# Patient Record
Sex: FEMALE | Race: BLACK OR AFRICAN AMERICAN
[De-identification: names, ages, dates, MRNs, and addresses within clinical notes are randomized per-mention and may not be internally consistent; named-entity substitution may affect disease eponyms.]

---

## 2020-07-12 ENCOUNTER — HOSPITAL ENCOUNTER (OUTPATIENT)
Dept: HOSPITAL 76 - DI | Age: 30
Discharge: HOME | End: 2020-07-12
Attending: INTERNAL MEDICINE
Payer: COMMERCIAL

## 2020-07-12 DIAGNOSIS — K58.8: Primary | ICD-10-CM

## 2020-07-12 DIAGNOSIS — N20.0: ICD-10-CM

## 2020-07-12 PROCEDURE — 76700 US EXAM ABDOM COMPLETE: CPT

## 2020-07-12 NOTE — ULTRASOUND REPORT
PROCEDURE:  Abdomen Complete

 

INDICATIONS:  IRRITABLE BOWEL SYNDROME

 

TECHNIQUE:  

Real-time scanning was performed of the abdominal and retroperitoneal organs, with image documentatio
n.  

 

COMPARISON:  None.

 

FINDINGS:  

 

Liver:  Liver is normal in size and homogeneous in echotexture.  

 

Gallbladder: Gallbladder wall thickness is within normal limits measuring 2 mm. No stones are identif
ied.

 

Biliary ducts:  Intrahepatic bile ducts are non-dilated.  Extrahepatic bile duct caliber measures 3 m
m.  Normal is 6-7 mm or less in diameter, or 10 mm or less post-cholecystectomy.  

 

Pancreas:  Visualized portions of the pancreas are sonographically normal.  

 

Spleen:  Spleen is normal in size and homogeneous in echotexture.  

 

Kidneys:  Kidneys are normal in size and echotexture.  Right kidney measures 10.5 cm long; left kidne
y measures 10.2 cm long.  No hydronephrosis. Bilateral nonobstructing calcifications are present with
 the largest on the left measuring 7 x 5 x 7 mm.

 

Aorta:  Visualized aorta is normal in caliber at less than 3 cm.  

 

Iliacs:  Proximal common iliac arteries are normal in caliber at less than 2.5 cm.  

 

IVC:  Intrahepatic inferior vena cava is patent.  

 

Miscellaneous:  No free abdominal fluid.  

 

IMPRESSION:  

 

1. Subcentimeter bilateral nonobstructing renal calculi. Otherwise, unremarkable exam.

 

Reviewed by: Jazmyn Luna MD on 7/12/2020 9:41 AM PDT

Approved by: Jazmyn Luna MD on 7/12/2020 9:41 AM PDT

 

 

Station ID:  [id number]

## 2020-07-20 ENCOUNTER — HOSPITAL ENCOUNTER (OUTPATIENT)
Dept: HOSPITAL 76 - DI | Age: 30
Discharge: HOME | End: 2020-07-20
Attending: ADVANCED PRACTICE MIDWIFE
Payer: COMMERCIAL

## 2020-07-20 DIAGNOSIS — R21: ICD-10-CM

## 2020-07-20 DIAGNOSIS — Z00.00: Primary | ICD-10-CM

## 2020-07-20 DIAGNOSIS — N63.10: ICD-10-CM

## 2020-07-20 PROCEDURE — 76642 ULTRASOUND BREAST LIMITED: CPT

## 2020-07-21 NOTE — ULTRASOUND REPORT
LIMITED ULTRASOUND OF RIGHT BREAST: 7/20/2020

CLINICAL: Palpable right breast lump.  

 

No prior exams were available for comparison.  

Real-time ultrasound of the right breast 10 o'clock region was performed.  Gray scale images of the r
eal-time examination were reviewed.  

 

No significant abnormalities were seen sonographically in the right breast.  

 

IMPRESSION: BENIGN 

There is no sonographic evidence of malignancy.  

There is no abnormality seen in the right breast to correspond with the area of clinical concern and 
palpable abnormality in the upper outer quadrant which likely represent normal fibroglandular tissue,
 however, recommend clinical followup for persistent or worsening symptoms and/or development of any 
clinically suspicious findings.   

 

Findings and recommendations were conveyed to the patient during today's visit.

 

This exam was interpreted at Station ID: [id number].  

Electronically Signed By: Andrew Bergman M.D.  

aty/:7/20/2020 13:37:23  

 

 

 

Ultrasound BI-RADS: 2 Benign

BI-RADS CATEGORY: (2) - 2

Unspecified - other

 

recall n/a

LATERALITY: (B)

## 2021-04-01 ENCOUNTER — HOSPITAL ENCOUNTER (OUTPATIENT)
Age: 31
End: 2021-04-01
Payer: COMMERCIAL

## 2021-04-01 DIAGNOSIS — Z34.01: Primary | ICD-10-CM

## 2021-04-01 LAB
ADD MANUAL DIFF / SLIDE REVIEW: NO
APPEARANCE UR: CLEAR
BILIRUBIN URINE UA: NEGATIVE
COLOR UR: YELLOW
GLUCOSE URINE UA: NEGATIVE G/DL
HBV SURFACE AG SERPL QL IA: NEGATIVE S/C
HCV AB SERPL QL IA: NEGATIVE S/C
HEMATOCRIT: 40.6 % (ref 36–46)
HEMOGLOBIN: 13.7 G/DL (ref 12–16)
HGB UR QL: (no result)
HIV 1+2 AB+HIV1 P24 AG SERPL QL IA: NEGATIVE
KETONES URINE UA: NEGATIVE
LEUKOCYTE ESTERASE URINE UA: NEGATIVE
LYMPHOCYTES # SPEC AUTO: 2000 /UL (ref 1100–4500)
MCV RBC: 84.4 FL (ref 80–100)
MEAN CORPUSCULAR HEMOGLOBIN: 28.4 PG (ref 26–34)
MEAN CORPUSCULAR HGB CONC: 33.7 % (ref 30–36)
NITRITE URINE UA: NEGATIVE
PH UR: 6.5 [PH] (ref 4.5–8)
PLATELET COUNT: 330 X10^3/UL (ref 150–400)
PROTEIN URINE UA: NEGATIVE
SP GR UR: 1.02 (ref 1–1.03)
UROBILINOGEN UR QL: 0.2 E.U./DL

## 2021-04-01 PROCEDURE — 86850 RBC ANTIBODY SCREEN: CPT

## 2021-04-01 PROCEDURE — 87389 HIV-1 AG W/HIV-1&-2 AB AG IA: CPT

## 2021-04-01 PROCEDURE — 86901 BLOOD TYPING SEROLOGIC RH(D): CPT

## 2021-04-01 PROCEDURE — 86900 BLOOD TYPING SEROLOGIC ABO: CPT

## 2021-04-01 PROCEDURE — 87086 URINE CULTURE/COLONY COUNT: CPT

## 2021-04-01 PROCEDURE — 87077 CULTURE AEROBIC IDENTIFY: CPT

## 2021-04-01 PROCEDURE — 86787 VARICELLA-ZOSTER ANTIBODY: CPT

## 2021-04-01 PROCEDURE — 87186 SC STD MICRODIL/AGAR DIL: CPT

## 2021-04-01 PROCEDURE — 87147 CULTURE TYPE IMMUNOLOGIC: CPT

## 2021-04-01 PROCEDURE — 36415 COLL VENOUS BLD VENIPUNCTURE: CPT

## 2021-04-01 PROCEDURE — 81003 URINALYSIS AUTO W/O SCOPE: CPT

## 2021-04-01 PROCEDURE — 86803 HEPATITIS C AB TEST: CPT

## 2021-04-29 ENCOUNTER — HOSPITAL ENCOUNTER (OUTPATIENT)
Age: 31
End: 2021-04-29
Payer: COMMERCIAL

## 2021-04-29 DIAGNOSIS — Z34.01: Primary | ICD-10-CM

## 2021-04-29 PROCEDURE — 87077 CULTURE AEROBIC IDENTIFY: CPT

## 2021-04-29 PROCEDURE — 87086 URINE CULTURE/COLONY COUNT: CPT

## 2021-06-24 ENCOUNTER — HOSPITAL ENCOUNTER
Age: 31
End: 2021-06-24
Payer: COMMERCIAL

## 2021-06-24 ENCOUNTER — HOSPITAL ENCOUNTER (OUTPATIENT)
Age: 31
End: 2021-06-24
Payer: COMMERCIAL

## 2021-06-24 DIAGNOSIS — Z34.02: Primary | ICD-10-CM

## 2021-06-24 DIAGNOSIS — Z3A.18: ICD-10-CM

## 2021-06-24 LAB
C TRACH DNA UR QL NAA+PROBE: NOT DETECTED
N GONORRHOEA RRNA UR QL NAA+PROBE: NOT DETECTED

## 2021-06-24 PROCEDURE — 87491 CHLMYD TRACH DNA AMP PROBE: CPT

## 2021-06-24 PROCEDURE — 36415 COLL VENOUS BLD VENIPUNCTURE: CPT

## 2021-06-24 PROCEDURE — 82105 ALPHA-FETOPROTEIN SERUM: CPT

## 2021-06-24 PROCEDURE — 86336 INHIBIN A: CPT

## 2021-06-24 PROCEDURE — 84702 CHORIONIC GONADOTROPIN TEST: CPT

## 2021-06-24 PROCEDURE — 82677 ASSAY OF ESTRIOL: CPT

## 2021-06-24 PROCEDURE — 87591 N.GONORRHOEAE DNA AMP PROB: CPT

## 2021-06-26 LAB
AFP SERPL-MCNC: 57.2 NG/ML
HCG ADJ MOM SERPL: 1.67
HCG SERPL-ACNC: (no result) MIU/ML
INHIBIN A ADJ MOM SERPL: 1.17
INHIBIN A SERPL-MCNC: 190.95 PG/ML
U ESTRIOL SERPL-MCNC: 1.48 NG/ML

## 2021-07-08 ENCOUNTER — HOSPITAL ENCOUNTER (OUTPATIENT)
Age: 31
End: 2021-07-08
Payer: COMMERCIAL

## 2021-07-08 DIAGNOSIS — Z3A.20: ICD-10-CM

## 2021-07-08 DIAGNOSIS — Z34.02: Primary | ICD-10-CM

## 2021-07-08 PROCEDURE — 76811 OB US DETAILED SNGL FETUS: CPT

## 2021-08-10 ENCOUNTER — HOSPITAL ENCOUNTER (OUTPATIENT)
Age: 31
End: 2021-08-10
Payer: COMMERCIAL

## 2021-08-10 DIAGNOSIS — Z3A.25: ICD-10-CM

## 2021-08-10 DIAGNOSIS — Z34.82: Primary | ICD-10-CM

## 2021-08-10 LAB
GTT (PREG) 1 HOUR PP 50GM DOSE: 165 MG/DL (ref 76–139)
HEMATOCRIT: 34.5 % (ref 36–46)
HEMOGLOBIN: 11.6 G/DL (ref 12–16)

## 2021-08-10 PROCEDURE — 82950 GLUCOSE TEST: CPT

## 2021-08-10 PROCEDURE — 36415 COLL VENOUS BLD VENIPUNCTURE: CPT

## 2021-08-10 PROCEDURE — 85014 HEMATOCRIT: CPT

## 2021-08-10 PROCEDURE — 85018 HEMOGLOBIN: CPT

## 2021-11-03 ENCOUNTER — HOSPITAL ENCOUNTER (OUTPATIENT)
Age: 31
End: 2021-11-03
Payer: COMMERCIAL

## 2021-11-03 DIAGNOSIS — Z34.03: Primary | ICD-10-CM

## 2021-11-03 DIAGNOSIS — Z3A.36: ICD-10-CM

## 2021-11-03 PROCEDURE — 87653 STREP B DNA AMP PROBE: CPT

## 2021-11-04 LAB — GP B STREP DNA SPEC QL NAA+PROBE: (no result)

## 2021-11-15 ENCOUNTER — HOSPITAL ENCOUNTER (INPATIENT)
Dept: HOSPITAL 76 - FBP | Age: 31
LOS: 3 days | Discharge: HOME | End: 2021-11-18
Attending: OBSTETRICS & GYNECOLOGY | Admitting: OBSTETRICS & GYNECOLOGY
Payer: COMMERCIAL

## 2021-11-15 DIAGNOSIS — Z3A.39: ICD-10-CM

## 2021-11-15 DIAGNOSIS — O24.419: ICD-10-CM

## 2021-11-15 LAB
AMPHET UR QL SCN: NEGATIVE
BARBITURATES UR QL SCN>300 NG/ML: NEGATIVE
BASOPHILS NFR BLD AUTO: 0 10^3/UL (ref 0–0.1)
BASOPHILS NFR BLD AUTO: 0.3 %
BENZODIAZ UR QL SCN: NEGATIVE
COCAINE UR-SCNC: NEGATIVE UMOL/L
EOSINOPHIL # BLD AUTO: 0.1 10^3/UL (ref 0–0.7)
EOSINOPHIL NFR BLD AUTO: 0.6 %
ERYTHROCYTE [DISTWIDTH] IN BLOOD BY AUTOMATED COUNT: 15 % (ref 12–15)
HCT VFR BLD AUTO: 38.1 % (ref 37–47)
HGB UR QL STRIP: 12.6 G/DL (ref 12–16)
IGF BP1 VAG QL: POSITIVE
LYMPHOCYTES # SPEC AUTO: 2 10^3/UL (ref 1.5–3.5)
LYMPHOCYTES NFR BLD AUTO: 17.7 %
MCH RBC QN AUTO: 28.1 PG (ref 27–31)
MCHC RBC AUTO-ENTMCNC: 33.1 G/DL (ref 32–36)
MCV RBC AUTO: 84.9 FL (ref 81–99)
METHADONE UR QL SCN: NEGATIVE
METHAMPHET UR QL SCN: NEGATIVE
MONOCYTES # BLD AUTO: 0.9 10^3/UL (ref 0–1)
MONOCYTES NFR BLD AUTO: 7.5 %
NEUTROPHILS # BLD AUTO: 8.4 10^3/UL (ref 1.5–6.6)
NEUTROPHILS # SNV AUTO: 11.4 X10^3/UL (ref 4.8–10.8)
NEUTROPHILS NFR BLD AUTO: 73.6 %
NRBC # BLD AUTO: 0 /100WBC
NRBC # BLD AUTO: 0 X10^3/UL
OPIATES UR QL SCN: NEGATIVE
PDW BLD AUTO: 11.3 FL (ref 7.9–10.8)
PLATELET # BLD: 250 10^3/UL (ref 130–450)
RBC MAR: 4.49 10^6/UL (ref 4.2–5.4)
THC UR QL SCN: NEGATIVE
VOLATILE DRUGS POS SERPL SCN: (no result)

## 2021-11-15 PROCEDURE — 85025 COMPLETE CBC W/AUTO DIFF WBC: CPT

## 2021-11-15 PROCEDURE — 80306 DRUG TEST PRSMV INSTRMNT: CPT

## 2021-11-15 PROCEDURE — 86900 BLOOD TYPING SEROLOGIC ABO: CPT

## 2021-11-15 PROCEDURE — 86901 BLOOD TYPING SEROLOGIC RH(D): CPT

## 2021-11-15 PROCEDURE — 99215 OFFICE O/P EST HI 40 MIN: CPT

## 2021-11-15 PROCEDURE — 84112 EVAL AMNIOTIC FLUID PROTEIN: CPT

## 2021-11-15 PROCEDURE — 36415 COLL VENOUS BLD VENIPUNCTURE: CPT

## 2021-11-15 PROCEDURE — 86850 RBC ANTIBODY SCREEN: CPT

## 2021-11-15 RX ADMIN — SODIUM CHLORIDE, POTASSIUM CHLORIDE, SODIUM LACTATE AND CALCIUM CHLORIDE SCH MLS/HR: 600; 310; 30; 20 INJECTION, SOLUTION INTRAVENOUS at 20:43

## 2021-11-15 RX ADMIN — Medication SCH MLS/HR: at 21:25

## 2021-11-15 RX ADMIN — SODIUM CHLORIDE, POTASSIUM CHLORIDE, SODIUM LACTATE AND CALCIUM CHLORIDE SCH MLS/HR: 600; 310; 30; 20 INJECTION, SOLUTION INTRAVENOUS at 18:40

## 2021-11-15 NOTE — PROVIDER PROGRESS NOTE
- HPI


Chief Complaint: Other (Patient presented with bloody discharge since 3:00am.  

Patient reports good fetal movement.  Patient states she had GDM in pregnancy.)


Current Pregnancy: 


Vital Signs











Temperature  98.6 F   11/15/21 15:30


 


Heart Rate  106 H  11/15/21 15:30


 


Respiratory Rate  16   11/15/21 15:30


 


Blood Pressure  128/84 H  11/15/21 15:30


 


O2 Saturation  100   11/15/21 15:30




















Temperature  98.6 F   11/15/21 15:30


 


Heart Rate  106 H  11/15/21 15:30


 


Respiratory Rate  16   11/15/21 15:30


 


Blood Pressure  128/84 H  11/15/21 15:30


 


O2 Saturation  100   11/15/21 15:30














- Exam





32yo  at 39 1/7 weeks with bloody discharge since 3:00 am.  Patient is 

feeling irregular contractions.  Patient reports good fetal movement.  Patient 

is unsure if she has experienced SROM or not.  Patient received prenatal care at

[facility] [place], patient presented to our facility for evaluation.





O- VSS, afebrile.


General:  Patient is resting comfortably and is not in acute distress.


Chest:  Clear to auscultation.  Good breath sounds in  all fields.


Heart: RRR without murmur or gallop.


Abdomen:  Soft, non-tender, gravid.


Extremities:  No pretibial pitting edema.


Monitor:  Baseline 140 with moderate variability and accelerations.  Irregular 

contractions every 4-6 minutes.  Reactive NST, Category I monitor strip.


Cervix:  3cm/80%/-3, posterior.





Discussed that if she makes cervical change or has SROM that we will admit here.

 Patient and  are good with this plan.  Patient states she is GBS 

negative and this was confirmed by labs.  Patient does desire labor epidural.





A-IUP 39 1/7 weeks by dates.  Gestational Diabetes resolved according to 

patient.


P-ROM plus and monitor for labor.  Admission if has ROM and or makes cervical 

change.

## 2021-11-15 NOTE — HISTORY & PHYSICAL EXAMINATION
Prenatal Admit History





- Visit Reason


Visit Reason: Bloody show





- Pregnancy


: 1


Parity: 0


Premature: 0


Ectopic: 0


: 0


Prenatal Care: positive: Other ([facility])


Prenatal Risk/History: positive: Gestational diabetes


Pregnancy Complications This Pregnancy: positive: None


Smoking Status: Never smoker





- Mother's Labs


Mother's Blood Type: positive: A


Mother's RH: positive: Positive


GBS: positive: Group B Step Negative


Rubella Status: positive: Immune





Meds/Allgy





- Allergies


Allergies/Adverse Reactions: 


                                    Allergies











Allergy/AdvReac Type Severity Reaction Status Date / Time


 


No Known Drug Allergies Allergy   Verified 11/15/21 16:06














Review of Systems





- Constitutional


Constitutional: denies: Fatigue, Fever, Chills





- Cardiovascular


Cariovascular: denies: Irregular heart rate, Palpitations





- Respiratory


Respiratory: denies: Cough, Sputum production, Wheezing





- Genitourinary


Genitourinary: denies: Dysuria, Frequency





- Neurological


Neurological: denies: General weakness, Focal weakness





- Psychiatric


Psychiatric: denies: Depression, Anxiety





Physical





- Abdominal Exam


Vital Signs: 





                                        











Temp Pulse Resp BP Pulse Ox


 


 98.6 F   106 H  16   128/84 H  100 


 


 11/15/21 15:30  11/15/21 15:30  11/15/21 15:30  11/15/21 15:30  11/15/21 15:30











Contraction Frequency (min/apart): 2-5 minutes


Contraction Intensity: positive: Mild to moderate


Uterine Resting Tone: positive: Soft





- Fetal Monitoring


Fetal Heart Rate Baseline: 140


Fetal Strip Review: positive: Category I





- Presentation


Presentation: positive: Vertex





- Vaginal Exam


Membranes: positive: Membranes ruptured


Dilation (in cm): 3


Effacement (%): 80


Station: positive: -3


Cervical Position: positive: Posterior





- Speculum Exam


Speculum Exam Performed: positive: No


Findings: positive: Other (ROM plus is positive.  When RN placed QTip 

significant fluid was seen.)





- Other Notes


Labor Progress Note/Additional Text: 





30yo  at 39 1/7 weeks who could not get off the [place] due to a truck 

blocking [place] [address].  Patient has been having a bloody discharge since 

3:00am.  Patient is feeling contractions every 5 minutes on admission.  When 

patient was having ROM + collected the RN noticed a significant amount of fluid 

that was not present with first cervical exam.  Patient has had good prenatal 

care in [place].  Patient reports good fetal movement.  Patient is feeling 

contractions.  Patient was unsure about SROM.  Patient states she wants an 

epidural when she can have one place








IUP 39  with SROM with clear fluid and Early labor.


P- Admit to Labor and Delivery with Expectant management and Epidural when 

needed.  Anticipate .

## 2021-11-15 NOTE — PROVIDER PROGRESS NOTE
Labor Progress Note





- Uterine Monitoring


Contraction Intensity: positive: Mild to moderate


Uterine Resting Tone: positive: Soft





- Fetal Monitoring


Fetal Monitor Mode: positive: Spiral electrode


Fetal Heart Rate Variability: positive: Moderate (6-25 bmp)


Fetal Accelerations: positive: Present, 15x15


Fetal Strip Review: positive: Category I, Category II





- Vaginal Exam


Dilation (in cm): 4


Effacement (%): 80-90


Station: -3 (Patient had a variabile deceleration, then the fetus was off the 

monitor.  Patient had a iván late after FSE placed.  Moderate variabilityis 

present throughout the strip.)





- Labor Progress Note


Labor Progress Note/Additional Text: 





Patient uis very comfortable with epidural.  Fetal heart tones came off external

monitor and could not be heard for 30 seconds.  Exam performed, 4cm/80-90/-3, 

forebag present.  Am[place]ook utilized to rupture forebag. Clear fluid present.  

FSE placed.  





Bedside ultrasound performed.  Vertex in the OP/OT position.  





Patient states she is tired and she is comfortable.  Schmitt placed to drainage.  

Patient to rest and them begin Pitocin augmentation of labor.





Continuous monitoring.

## 2021-11-15 NOTE — ANESTHESIA
Pre-Anesthesia VS, & Labs





- Diagnosis





active labor





- Procedure





labor epidural


Vital Signs: 





                                        











Temp Pulse Resp BP Pulse Ox


 


 36.8 C   89   16   107/66   100 


 


 11/15/21 17:30  11/15/21 17:30  11/15/21 17:30  11/15/21 17:30  11/15/21 16:23











Height: 5 ft


Weight (kg): 68.946 kg


Body Mass Index: 29.7


BMI Classification: Overweight





- Pregnancy


Is Patient Pregnant?: Yes





- Lab Results


Current Lab Results: 





Laboratory Tests





11/15/21 17:21: Blood Type A POSITIVE, Antibody Screen NEGATIVE


11/15/21 17:21: WBC 11.4 H, RBC 4.49, Hgb 12.6, Hct 38.1, MCV 84.9, MCH 28.1, 

MCHC 33.1, RDW 15.0, Plt Count 250, MPV 11.3 H, Neut # (Auto) 8.4 H, Lymph # 

(Auto) 2.0, Mono # (Auto) 0.9, Eos # (Auto) 0.1, Baso # (Auto) 0.0, Absolute 

Nucleated RBC 0.00, Nucleated RBC % 0.0








Lab results reviewed: Yes


Fish Bones: 


                                 11/15/21 17:21








Home Medications and Allergies





Active Medications





Acetaminophen (Acetaminophen 325 Mg Tablet)  650 mg PO Q6H KATARZYNA


Carboprost Tromethamine (Carboprost Tromethamine 250 Mcg/Ml Amp)  250 mcg IM 

Q15M PRN


   PRN Reason: Step 4: Hemorrhage protocol


   Stop: 21 17:11


Fentanyl (Fentanyl 100 Mcg/2 Ml Vial)  50 mcg IVP Q1H PRN


   PRN Reason: PAIN


Hydralazine HCl (Hydralazine Inj 20 Mg/Ml Vial)  10 mg IVP .ONCE PRN; Protocol


   PRN Reason: Step 9 of Labetalol protocol


   Stop: 21 17:14


Lactated Ringer's (Lr)  1,000 mls @ 150 mls/hr IV .Q6H40M KATARZYNA


   Last Admin: 11/15/21 18:40 Dose:  500 mls/hr


   Documented by: 


Oxytocin/Sodium Chloride (Pitocin/Sodium Chloride)  500 mls @ 999 mls/hr IV PRN 

PRN; Protocol


   PRN Reason: POST-PARTUM HEMORR PREVENTION


   Stop: 21 17:11


Tranexamic Acid (Tranexamic 1,000 Mg/100ml-Nacl)  1,000 mg in 100 mls @ 600 

mls/hr IV .ONCE PRN


   PRN Reason: EBL >1200mL and within 3hr


   Stop: 21 17:11


Oxytocin/Sodium Chloride (Pitocin/Sodium Chloride)  500 mls @ 1 mls/hr IV TITR 

KATARZYNA; Protocol


Labetalol HCl (Labetalol 20 Mg/4 Ml Syringe)  20 - 80 mg IVP Q10M PRN; Protocol


   PRN Reason: SBP >160 or DBP >110


Lidocaine HCl (Lidocaine-Mpf 1% 30 Ml Vial)  30 ml ID .ONCE PRN


   PRN Reason: PERINEAL REPAIR


   Stop: 21 17:11


Methylergonovine Maleate (Methylergonovine 0.2 Mg/Ml Vial)  0.2 mg IM .ONCE PRN


   PRN Reason: Step 2: Hemorrhage protocol


   Stop: 21 17:11


Metoclopramide HCl (Metoclopramide 10 Mg/2 Ml Vial)  10 mg IVP Q6H PRN


   PRN Reason: Nausea / Vomiting


Misoprostol (Misoprostol 200 Mcg Tablet)  800 mcg BC .ONCE PRN


   PRN Reason: Step 3: Hemorrhage protocol


   Stop: 21 17:11


Ondansetron HCl (Ondansetron 4 Mg/2 Ml Vial)  4 mg IVP Q4H PRN


   PRN Reason: Nausea / Vomiting


Oxytocin (Oxytocin 10 Unit/Ml Vial)  10 unit IM .ONCE PRN


   PRN Reason: Step one: If no IV access


   Stop: 21 17:11


Sodium Chloride (Sodium Chloride Flush 0.9% 10 Ml Syringe)  10 ml IVP PRN PRN


   PRN Reason: AS NEEDED PER PROVIDER ORDERS


Sodium Chloride (Sodium Chloride Flush 0.9% 10 Ml Syringe)  10 ml IVP 

0100,0900,1700 [facility]








Allergies/Adverse Reactions: 


                                    Allergies











Allergy/AdvReac Type Severity Reaction Status Date / Time


 


No Known Drug Allergies Allergy   Verified 11/15/21 16:06














Anes History & Medical History





- Anesthetic History


Anesthesia Complications: reports: No previous complications


Family history of Anesthesia Complications: Denies


Family history of Malignant Hyperthermia: Denies





- Medical History


Neuro: reports: Other (low back pain)


Smoking Status: Never smoker





- Obstetrical History


: 1


Parity: 0


Prenatal Events: reports: Gestational diabetes


Pregnancy Complications: reports: None





Exam


General: Alert, Oriented x3, Cooperative, No acute distress


Dental: WNL


Mouth Openin Fingerbreadth





Plan


Anesthesia Type: Epidural


Consent for Procedure(s) Verified and Reviewed: Yes


Code Status: Attempt Resuscitation


ASA classification: 2-Mild systemic disease


Is this case an emergency?: No

## 2021-11-16 PROCEDURE — 0UQMXZZ REPAIR VULVA, EXTERNAL APPROACH: ICD-10-PCS | Performed by: OBSTETRICS & GYNECOLOGY

## 2021-11-16 RX ADMIN — SODIUM CHLORIDE, PRESERVATIVE FREE SCH ML: 5 INJECTION INTRAVENOUS at 14:45

## 2021-11-16 RX ADMIN — ACETAMINOPHEN SCH: 325 TABLET ORAL at 17:42

## 2021-11-16 RX ADMIN — IBUPROFEN SCH: 600 TABLET, FILM COATED ORAL at 17:44

## 2021-11-16 RX ADMIN — ACETAMINOPHEN SCH MG: 325 TABLET ORAL at 04:34

## 2021-11-16 RX ADMIN — IBUPROFEN SCH MG: 600 TABLET, FILM COATED ORAL at 13:20

## 2021-11-16 RX ADMIN — ACETAMINOPHEN SCH MG: 325 TABLET ORAL at 21:00

## 2021-11-16 RX ADMIN — ACETAMINOPHEN SCH MG: 325 TABLET ORAL at 13:21

## 2021-11-16 RX ADMIN — IBUPROFEN SCH MG: 600 TABLET, FILM COATED ORAL at 19:50

## 2021-11-16 RX ADMIN — SODIUM CHLORIDE, POTASSIUM CHLORIDE, SODIUM LACTATE AND CALCIUM CHLORIDE SCH: 600; 310; 30; 20 INJECTION, SOLUTION INTRAVENOUS at 17:43

## 2021-11-16 RX ADMIN — DOCUSATE SODIUM SCH MG: 100 CAPSULE, LIQUID FILLED ORAL at 21:01

## 2021-11-16 RX ADMIN — SODIUM CHLORIDE, POTASSIUM CHLORIDE, SODIUM LACTATE AND CALCIUM CHLORIDE SCH MLS/HR: 600; 310; 30; 20 INJECTION, SOLUTION INTRAVENOUS at 03:34

## 2021-11-16 RX ADMIN — ACETAMINOPHEN SCH: 325 TABLET ORAL at 17:44

## 2021-11-16 RX ADMIN — SODIUM CHLORIDE, PRESERVATIVE FREE SCH: 5 INJECTION INTRAVENOUS at 17:44

## 2021-11-16 RX ADMIN — ACETAMINOPHEN SCH: 325 TABLET ORAL at 17:43

## 2021-11-16 RX ADMIN — Medication SCH: at 17:53

## 2021-11-16 RX ADMIN — SODIUM CHLORIDE, PRESERVATIVE FREE SCH: 5 INJECTION INTRAVENOUS at 17:42

## 2021-11-16 RX ADMIN — DOCUSATE SODIUM SCH: 100 CAPSULE, LIQUID FILLED ORAL at 17:38

## 2021-11-16 NOTE — DELIVERY NOTE
Delivery Note





- Labor


Labor: positive: Augmented by oxytocin





- Infant Delivery Method


Infant Delivery Method: positive: Vacuum assist





- Birth Presentation


Birth Presentation: positive: Vertex, URIEL - right occiput anterior





- Nuchal Cord


Nuchal Cord: positive: None





- Anesthetic


Anesthetic Type: 





- Amniotic Fluid Description


Amniotic Fluid Description: positive: Light meconium





- Vacuum Use


Indication for Vacuum Use: positive: Prolonged 2nd stage, Shortening of 2nd 

stage for maternal benefit


Type of Vacuum Cup: positive: Cup: Mushroom Type


Vacuum Extraction: positive: Successful





- Episiotomy Type


Episiotomy Type: positive: Midline





- Laceration


Laceration: positive: Vaginal





- Suture


Suture Type: positive: Vicryl


Suture Size: positive: 2-0





- Delivery Outcome


Delivery Outcome: positive: Livebirth





- [place]


[place]: positive: Placed in direct skin contact with mother


 sex: positive: Male





- Cord


Cord: positive: 3 vessels





- Placenta


Placenta: positive: Intact, Spontaneous





- Estimated Blood Loss


Estimated Blood Loss (in cc): 600 (Patient sustained vaginal, periurethral 

lacerations that bled significantly when vacuum was placed.  2-0 Vicryl in 

interrupted fashion utilized times 5 sutures to obtain hemostasis prior to 

attempting vacuum assisted vaginal delivery.)





- Delivery Comments (Free Text/Narrative)


Delivery Comments (Free Text/Narrative): 





32 yo  at 39 1/7 presented with "bloody show" and having irregular co

ntractions.  Patient had SROM test performed and while test was being performed,

SROM was witnessed by ZULEMA Flores.  Test was positive and patient was admitted at

3/80/-3 and alex every 4-6 minutes.  3 hours later RN attempted to check 

patient and patient could not tolerate exam.  Epidural anesthesia was placed.  

Patient was then 4cn/90%/-3.  There were 2 variables that occurred and forebag 

was present and AROM was performed of forebag.  Clear fluid was present.  

Patient was started on Pitocin Augmentation of labor.





Patient progressed to 8.5 cm and was -1 station.  One and a half hours later she

was writing in pain, complete and plus two.  Anesthesia came and re-adjusted her

epidural.  Patient pushed for 3 hours and the head was 2+ to 3+, not , 

but very close.  Patient was feeling a little pressure in the vagina, but 

washaving tremendous pain in her neck and shoulders and was not able to push 

effectively.  It was decided to give her a break.  Anesthesia recommended 50mcg 

of Fentanyl which was given.  Patient had about an hour break.  Dr. Manning was 

present due to Meconium and plan to utilize vacuum with the delivery after the 

break.  Patient had  had been verbally consented about use of vacuum.  

They agreed to try it.





Patient was placed at end of table and the bottom was removed.  Kiwi vacuum was 

placed in vagina and it caused a superficial laceration to periurethral area.  

It caused significant bleeding and 2-0 Vicryl in interrupted fashion times 5 was

placed to stop bleeding.  Her tissue was edematous and easily lacerated.   

Patient sustained a left vaginal/introitus laceration just inside vulva prior to

delivery.  2 interrupted 2-0 Vicryl sutures placed to obtain hemostais.   

Patient's bladder was drained of 75cc of dark urine.  





With the next contraction the vacuum was utilized.  Progress occurred with each 

contraction.  Vacuum was utilized with 5 contractions.  2 Pop-offs occurred.  

Patient pushed on her own for 2-3 contractions and the head was protruding from 

the introitus.  Vacuum applied with minimal pressure ( in yellow) to assist with

the last effort of delivery of head.  A midline Episiotomy was performed prior 

to the last contraction and vacuum application.  





Head delivered in URIEL position.  Maternal forces delivered anterior shoulder 

under pubic bone.  Remainder of infant delivered spontaneously.  Living male 

with copious light meconium and small amount of terminal meconium and APGARS of 

9/9/  Cord clamped times two and cut.  Cord blood obtained.  Pitocin started in 

IV.  Placenta delivered spontaneously, Intact, BRITNEY.  Dr. Cisneros came in room 

shortly after delivery.





2-0 Vicryl in 2 layer running fashion was utilized to repair episiotomy.  

Interrupted suture of 2-0 Vicryl times 4 was utilized to obtain good appro

ximation and hemostais of vaginal mucosa and skin on perineum.  Due to swelling 

in periurethral area a valenzuela was placed to drainage.   Lap, sponge, needle and 

instrument counts were correct.  EBL is 600cc, most of which occurred prior to 

delivery with vaginal lacerations.  Patient and infant are resting in stable 

condition.

## 2021-11-16 NOTE — PROVIDER PROGRESS NOTE
Labor Progress Note





- Uterine Monitoring


Contraction Frequency (min/apart): 3-5 minutes


Contraction Intensity: positive: Moderate


Uterine Resting Tone: positive: Soft





- Fetal Monitoring


Fetal Monitor Mode: positive: Spiral electrode


Fetal Heart Rate Baseline: 140


Fetal Heart Rate Variability: positive: Moderate (6-25 bmp)


Fetal Accelerations: positive: Present, 15x15


Fetal Decelerations: positive: Variable, Intermittent (<50% x20 min)


Fetal Strip Review: positive: Category I (Occasionally has Category II strip, 

occasional minimal variability, mostly moderate variability.  Accelerations are 

present.  Occasional variable.)





- Vaginal Exam


Dilation (in cm): Copmplete


Station: 2 (Patient has been pushing, somewhat ineffectively, for 3 hours,)





- Labor Progress Note


Labor Progress Note/Additional Text: 





Patient has been pushing mostly ineffectively for 3 hours.  The first hour there

was not directed pushing with RN.  Patient has been complaining about pain in 

her shoulder and neck and this prevents her from maintaining effective pushing. 

Discussed with patient and  the need for a brake.  Consulted with 

Anesthesia about maintaining pain control but becoming more effective with 

pushing.  Discussed giving her Fentanyl for her neck and shoulder pain. Patient 

can feel our fingers when we examine her and place them in the vagina.





Exam:  Head is 2+ station.  Two fingers can be placed between the infants head 

and the pubic bone.  There is a lot of room in the posterior vagina for the 

head.  Pelvis appears adequate for delivery.  





Discussed utilizing the vacuum to assist in the delivery of the head.  Explained

that it can only be utilized with 4 contractions and it would be preferred if 

the head descends a little more.  Take a break for an hour and attempt to get 

pain control of her neck and shoulders.  Anesthesia consulted and is staying 

available to assist us.

## 2021-11-17 LAB
BASOPHILS # BLD MANUAL: 0 10^3/UL (ref 0–0.1)
BASOPHILS NFR BLD AUTO: 0.6 %
EOSINOPHIL # BLD MANUAL: 0.3 10^3/UL (ref 0–0.7)
EOSINOPHIL NFR BLD AUTO: 1 %
ERYTHROCYTE [DISTWIDTH] IN BLOOD BY AUTOMATED COUNT: 15.4 % (ref 12–15)
HCT VFR BLD AUTO: 26.8 % (ref 37–47)
HGB UR QL STRIP: 8.9 G/DL (ref 12–16)
LYMPH ABN NFR BLD MANUAL: 0 %
LYMPHOBLASTS # BLD: 17 %
LYMPHOCYTES # BLD MANUAL: 2.6 10^3/UL (ref 1.5–3.5)
LYMPHOCYTES NFR BLD AUTO: 17.7 %
MANUAL DIF COMMENT BLD-IMP: (no result)
MCH RBC QN AUTO: 28.7 PG (ref 27–31)
MCHC RBC AUTO-ENTMCNC: 33.2 G/DL (ref 32–36)
MCV RBC AUTO: 86.5 FL (ref 81–99)
MONOCYTES # BLD MANUAL: 1.4 10^3/UL (ref 0–1)
MONOCYTES NFR BLD AUTO: 7.6 %
NEUTROPHILS # SNV AUTO: 15.4 X10^3/UL (ref 4.8–10.8)
NEUTROPHILS NFR BLD AUTO: 72.7 %
NEUTROPHILS NFR BLD MANUAL: 11.1 10^3/UL (ref 1.5–6.6)
NEUTS BAND NFR BLD: 3 %
PDW BLD AUTO: 11.2 FL (ref 7.9–10.8)
PLAT MORPH BLD: (no result)
PLATELET # BLD: 179 10^3/UL (ref 130–450)
PLATELET BLD QL SMEAR: (no result)
RBC MAR: 3.1 10^6/UL (ref 4.2–5.4)
RBC MORPH BLD: (no result)
WBC MORPH BLD: (no result)

## 2021-11-17 RX ADMIN — ACETAMINOPHEN SCH MG: 325 TABLET ORAL at 05:41

## 2021-11-17 RX ADMIN — SODIUM CHLORIDE, POTASSIUM CHLORIDE, SODIUM LACTATE AND CALCIUM CHLORIDE SCH: 600; 310; 30; 20 INJECTION, SOLUTION INTRAVENOUS at 16:59

## 2021-11-17 RX ADMIN — IBUPROFEN SCH MG: 600 TABLET, FILM COATED ORAL at 05:41

## 2021-11-17 RX ADMIN — DOCUSATE SODIUM SCH MG: 100 CAPSULE, LIQUID FILLED ORAL at 12:45

## 2021-11-17 RX ADMIN — Medication SCH: at 16:59

## 2021-11-17 RX ADMIN — ACETAMINOPHEN SCH MG: 325 TABLET ORAL at 18:51

## 2021-11-17 RX ADMIN — SODIUM CHLORIDE, POTASSIUM CHLORIDE, SODIUM LACTATE AND CALCIUM CHLORIDE SCH: 600; 310; 30; 20 INJECTION, SOLUTION INTRAVENOUS at 16:58

## 2021-11-17 RX ADMIN — ACETAMINOPHEN SCH MG: 325 TABLET ORAL at 12:45

## 2021-11-17 RX ADMIN — IBUPROFEN SCH MG: 600 TABLET, FILM COATED ORAL at 18:51

## 2021-11-17 RX ADMIN — SODIUM CHLORIDE, PRESERVATIVE FREE SCH: 5 INJECTION INTRAVENOUS at 16:59

## 2021-11-17 RX ADMIN — SODIUM CHLORIDE, PRESERVATIVE FREE SCH: 5 INJECTION INTRAVENOUS at 17:00

## 2021-11-17 RX ADMIN — SODIUM CHLORIDE, POTASSIUM CHLORIDE, SODIUM LACTATE AND CALCIUM CHLORIDE SCH: 600; 310; 30; 20 INJECTION, SOLUTION INTRAVENOUS at 17:00

## 2021-11-17 RX ADMIN — IBUPROFEN SCH MG: 600 TABLET, FILM COATED ORAL at 12:45

## 2021-11-17 NOTE — PROVIDER PROGRESS NOTE
Subjective





- Prog Note Date


Prog Note Date: 21


Prog Note Time: 14:28





- Subjective


Pt reports feeling: Improved


Subjective: 





Patient is 32yo  who is postpartum day 1 Vacuum assisted vaginal 

delivery.  Patient is feeling really good today.  Patient had valenzuela stay in 

overnight due to swelling in her periurethral area.  Valenzuela has been removed.  

Patient states pain is well controlled.  Patient is breast feeding. Patient has 

ambulated.





O- VSS, afebrile.





Objective





- Vital Signs/Intake & Output


Reviewed Vital Signs: Yes


Intake & Output: 


                                 Intake & Output











 11/14/21 11/15/21 11/16/21 11/17/21





 23:59 23:59 23:59 23:59


 


Intake Total  1700 2690.000 240


 


Output Total  475 385 620


 


Balance  1225 2305.000 -380














- Objective


General Appearance: positive: No acute distress


Respiratory: positive: Breath sounds nml.  negative: Wheezes, Rales


Cardiovascular: positive: Regular rate & rhythm, No murmur, No gallop


Abdomen: positive: Nml bowel sounds, Other (Soft, non-tender to palpation.  

Uterus is firm and just below the umbilicus.).  negative: Guarding, Rebound


Extremities: positive: Non-tender, No pedal edema


Neurologic/Psychiatric: positive: Mood/affect nml





- Lab Results


Fish Bones: 


                                 21 07:10





Other Labs: 


                               Lab Results x24hrs











  21 Range/Units





  07:10 07:10 


 


WBC  15.4 H   (4.8-10.8)  x10^3/uL


 


RBC  3.10 L   (4.20-5.40)  10^6/uL


 


Hgb  8.9 L   (12.0-16.0)  g/dL


 


Hct  26.8 L   (37.0-47.0)  %


 


MCV  86.5   (81.0-99.0)  fL


 


MCH  28.7   (27.0-31.0)  pg


 


MCHC  33.2   (32.0-36.0)  g/dL


 


RDW  15.4 H   (12.0-15.0)  %


 


Plt Count  179   (130-450)  10^3/uL


 


MPV  11.2 H   (7.9-10.8)  fL


 


Neut # (Auto)  Not Reportable   


 


Lymph # (Auto)  Not Reportable   


 


Mono # (Auto)  Not Reportable   


 


Eos # (Auto)  Not Reportable   


 


Baso # (Auto)  Not Reportable   


 


Absolute Nucleated RBC  Not Reportable   


 


Total Counted  100   


 


Band Neuts % (Manual)  3  (0 - 10) %


 


Abnorm Lymph % (Manual)  0   %


 


Nucleated RBC %  Not Reportable   


 


Neutrophils # (Manual)  11.1 H   (1.5-6.6)  10^3/uL


 


Lymphocytes # (Manual)  2.6   (1.5-3.5)  10^3/uL


 


Monocytes # (Manual)  1.4 H   (0.0-1.0)  10^3/uL


 


Eosinophils # (Manual)  0.3   (0-0.7)  10^3/uL


 


Basophils # (Manual)  0.0   (0-0.1)  10^3/uL


 


Differential Comment  MANUAL DIFFERENTIAL   


 


WBC Morphology  1+ TOXIC GRANULATION   (NORMAL)  


 


Platelet Estimate  NORMAL (130-450,000)   (NORMAL)  


 


Platelet Morphology  NORMAL APPEARANCE   (NORMAL)  


 


RBC Morph Micro Appear  NORMAL APPEARANCE   (NORMAL)  


 


Blood Type Recheck   A POSITIVE  














- Other Results/Comments


Other Results/Comments: 





A- Postpartum day 1, doing well.  S/P Vacuum assisted vaginal delivery.


P- COntinue routine postpartum care.  Remove Abdominal binder that patient 

brought from home and placed.  Plan to discharge to home tomorrow.

## 2021-11-18 VITALS — DIASTOLIC BLOOD PRESSURE: 56 MMHG | SYSTOLIC BLOOD PRESSURE: 101 MMHG

## 2021-11-18 RX ADMIN — IBUPROFEN SCH: 600 TABLET, FILM COATED ORAL at 09:33

## 2021-11-18 RX ADMIN — IBUPROFEN SCH MG: 600 TABLET, FILM COATED ORAL at 09:33

## 2021-11-18 RX ADMIN — DOCUSATE SODIUM SCH MG: 100 CAPSULE, LIQUID FILLED ORAL at 09:33

## 2021-11-18 RX ADMIN — ACETAMINOPHEN SCH MG: 325 TABLET ORAL at 01:56

## 2021-11-18 RX ADMIN — DOCUSATE SODIUM SCH MG: 100 CAPSULE, LIQUID FILLED ORAL at 01:54

## 2021-11-18 RX ADMIN — ACETAMINOPHEN SCH MG: 325 TABLET ORAL at 09:33

## 2021-11-18 RX ADMIN — IBUPROFEN SCH MG: 600 TABLET, FILM COATED ORAL at 01:54

## 2021-11-18 NOTE — DISCHARGE PLAN
Discharge Plan


Problem Reviewed?: No


Disposition: 01 Home, Self Care


Condition: Good


Prescriptions: 


Ibuprofen [Motrin] 600 mg PO Q6H PRN 5 Days #40 tablet


 PRN Reason: Pain


No Smoking: If you smoke, Please STOP!  Call [phone] for help.

## 2021-11-18 NOTE — DISCHARGE SUMMARY
Discharge Summary


Admit Date: 11/15/21


Discharge Date: 21


Discharging Provider: Isrrael Vasquez DO


Condition at Discharge: Good


Discharge Disposition: 01 Home, Self Care


Discharge Facility Name: Located within [facility]





- DIAGNOSES


Admission Diagnoses: 





IUP 39 2/7 , Spontaneous Rupture of Membranes


Discharge Diagnoses with Status of Each Condition: 





A/P Vacuum assisted vaginal Delivery 





- HPI


History of Present Illness: 





32 yo  at 39 2/7 weeks with SROM presented to Labor and Delivery because she

[place] not [place] to get to [facility].  Patient had

good prenatal care at [facility].  





- CONSULTS | PROCEDURES


Procedures: 





Augmentation of labor, Epidural Anesthesia, Vacuum assisted vaginal delivery, 

Midline episiotomy with repair.





- HOSPITAL COURSE


Hospital Course: 





Patient was admitted with SROM on Monday afternoon.  Patient had epidural 

anesthesia placed.  Patient progressed, slowly to complete and 2+ station.  

Patient had a prolonged second stage of  labor due to maternal efforts we 

inhibited by pain.  Patient agreed to attempt Vacuum assisted vaginal delivery. 

Patient had a vacuum assisted vaginal delivery over Midline episiotomy.  Living 

male with Apgars of 9/9.  Schmitt placed immediately after delivery due to 

swelling.  Patient has had routine postpartum course.  Patient is breast feeding

and going to be discharged today.





- ALLERGIES


Allergies/Adverse Reactions: 


                                    Allergies











Allergy/AdvReac Type Severity Reaction Status Date / Time


 


No Known Drug Allergies Allergy   Verified 11/15/21 16:06














- PHYSICAL EXAM AT DISCHARGE


General Appearance: positive: No acute distress


Respiratory: positive: Breath sounds nml.  negative: Wheezes, Rales


Cardiovascular: positive: Regular rate & rhythm, No murmur, No gallop


Abdomen: positive: Non-tender, Nml bowel sounds, Other (Fundus is firm and below

the umbilicus.)


Extremities: positive: Nml appearance, No pedal edema.  negative: Calf 

tenderness


Neurologic/Psychiatric: positive: Mood/affect nml


Reflexes: Knee (R): 2+





- LABS


Result Diagrams: 


                                 21 07:10








- FOLLOW UP


Follow Up: 





Follow-up at her OB/Gyn office next Tuesday or Wednesday.





- TIME SPENT


Time Spent in Discharge (Minutes): 30

## 2022-03-10 ENCOUNTER — HOSPITAL ENCOUNTER
Age: 32
End: 2022-03-10
Payer: COMMERCIAL

## 2022-03-10 DIAGNOSIS — N89.8: Primary | ICD-10-CM

## 2022-03-10 PROCEDURE — 87510 GARDNER VAG DNA DIR PROBE: CPT

## 2022-03-10 PROCEDURE — 87480 CANDIDA DNA DIR PROBE: CPT

## 2022-03-10 PROCEDURE — 87660 TRICHOMONAS VAGIN DIR PROBE: CPT

## 2022-12-10 ENCOUNTER — HOSPITAL ENCOUNTER (EMERGENCY)
Age: 32
LOS: 1 days | Discharge: HOME | End: 2022-12-11
Payer: COMMERCIAL

## 2022-12-10 VITALS — RESPIRATION RATE: 16 BRPM | HEART RATE: 140 BPM | OXYGEN SATURATION: 100 % | TEMPERATURE: 98.2 F

## 2022-12-10 DIAGNOSIS — N39.0: ICD-10-CM
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
## 2022-12-10 DIAGNOSIS — S30.814A: Primary | ICD-10-CM

## 2022-12-10 PROCEDURE — 87086 URINE CULTURE/COLONY COUNT: CPT

## 2022-12-10 PROCEDURE — 99282 EMERGENCY DEPT VISIT SF MDM: CPT

## 2022-12-10 PROCEDURE — 81025 URINE PREGNANCY TEST: CPT

## 2022-12-10 PROCEDURE — 81003 URINALYSIS AUTO W/O SCOPE: CPT

## 2022-12-10 PROCEDURE — 81015 MICROSCOPIC EXAM OF URINE: CPT

## 2022-12-11 LAB — URINE COMMENTS: (no result)

## 2023-03-02 ENCOUNTER — HOSPITAL ENCOUNTER (OUTPATIENT)
Age: 33
End: 2023-03-02
Payer: COMMERCIAL

## 2023-03-02 DIAGNOSIS — O09.299: Primary | ICD-10-CM

## 2023-03-02 DIAGNOSIS — Z86.32: ICD-10-CM

## 2023-03-02 LAB
ADD MANUAL DIFF / SLIDE REVIEW: NO
APPEARANCE UR: CLEAR
BILIRUBIN URINE UA: NEGATIVE
COLOR UR: YELLOW
GLUCOSE URINE UA: NEGATIVE G/DL
HBA1C MFR BLD: 5.2 % (ref 4–6)
HBV SURFACE AG SERPL QL IA: NEGATIVE S/C
HCV AB SERPL QL IA: NEGATIVE S/C
HEMATOCRIT: 36.9 % (ref 36–46)
HEMOGLOBIN: 12.6 G/DL (ref 12–16)
HGB UR QL: NEGATIVE
HIV 1+2 AB+HIV1 P24 AG SERPL QL IA: NEGATIVE
KETONES URINE UA: NEGATIVE
LEUKOCYTE ESTERASE URINE UA: NEGATIVE
LYMPHOCYTES # SPEC AUTO: 2500 /UL (ref 1100–4500)
MCV RBC: 82.8 FL (ref 80–100)
MEAN CORPUSCULAR HEMOGLOBIN: 28.2 PG (ref 26–34)
MEAN CORPUSCULAR HGB CONC: 34.1 % (ref 30–36)
NITRITE URINE UA: NEGATIVE
PH UR: 6 [PH] (ref 4.5–8)
PLATELET COUNT: 322 X10^3/UL (ref 150–400)
PROTEIN URINE UA: NEGATIVE
SP GR UR: 1.01 (ref 1–1.03)
SPECIMEN LABEL: (no result)
UROBILINOGEN UR QL: 0.2 E.U./DL

## 2023-03-02 PROCEDURE — 86900 BLOOD TYPING SEROLOGIC ABO: CPT

## 2023-03-02 PROCEDURE — 86803 HEPATITIS C AB TEST: CPT

## 2023-03-02 PROCEDURE — 81003 URINALYSIS AUTO W/O SCOPE: CPT

## 2023-03-02 PROCEDURE — 86901 BLOOD TYPING SEROLOGIC RH(D): CPT

## 2023-03-02 PROCEDURE — 87389 HIV-1 AG W/HIV-1&-2 AB AG IA: CPT

## 2023-03-02 PROCEDURE — 36415 COLL VENOUS BLD VENIPUNCTURE: CPT

## 2023-03-02 PROCEDURE — 86850 RBC ANTIBODY SCREEN: CPT

## 2023-03-02 PROCEDURE — 87086 URINE CULTURE/COLONY COUNT: CPT

## 2023-03-02 PROCEDURE — 83036 HEMOGLOBIN GLYCOSYLATED A1C: CPT

## 2023-03-02 PROCEDURE — 86787 VARICELLA-ZOSTER ANTIBODY: CPT

## 2023-04-26 ENCOUNTER — HOSPITAL ENCOUNTER (OUTPATIENT)
Age: 33
End: 2023-04-26
Payer: COMMERCIAL

## 2023-04-26 DIAGNOSIS — Z3A.17: ICD-10-CM

## 2023-04-26 DIAGNOSIS — Z34.82: Primary | ICD-10-CM

## 2023-04-26 PROCEDURE — 82105 ALPHA-FETOPROTEIN SERUM: CPT

## 2023-04-26 PROCEDURE — 36415 COLL VENOUS BLD VENIPUNCTURE: CPT

## 2023-04-29 LAB
GA: 17.9 WEEKS
NEURAL TUBE DEFECT RISK FETUS: 6301 %

## 2023-05-03 LAB — LABORATORY REPORT: (no result)

## 2023-05-18 ENCOUNTER — HOSPITAL ENCOUNTER (OUTPATIENT)
Age: 33
End: 2023-05-18
Payer: COMMERCIAL

## 2023-05-18 DIAGNOSIS — Z3A.20: ICD-10-CM

## 2023-05-18 DIAGNOSIS — Z34.82: Primary | ICD-10-CM

## 2023-05-18 PROCEDURE — 76811 OB US DETAILED SNGL FETUS: CPT

## 2023-07-15 ENCOUNTER — HOSPITAL ENCOUNTER (OUTPATIENT)
Dept: HOSPITAL 73 - OB | Age: 33
Discharge: HOME | End: 2023-07-15
Payer: COMMERCIAL

## 2023-07-15 DIAGNOSIS — Z3A.29: ICD-10-CM

## 2023-07-15 DIAGNOSIS — O26.853: Primary | ICD-10-CM

## 2023-07-15 DIAGNOSIS — O26.893: ICD-10-CM

## 2023-07-15 DIAGNOSIS — R10.9: ICD-10-CM

## 2023-07-15 PROCEDURE — G0379 DIRECT REFER HOSPITAL OBSERV: HCPCS

## 2023-07-15 PROCEDURE — G0378 HOSPITAL OBSERVATION PER HR: HCPCS

## 2023-07-15 PROCEDURE — 59025 FETAL NON-STRESS TEST: CPT

## 2023-09-01 ENCOUNTER — HOSPITAL ENCOUNTER (OUTPATIENT)
Age: 33
End: 2023-09-01
Payer: COMMERCIAL

## 2023-09-01 DIAGNOSIS — R31.9: Primary | ICD-10-CM

## 2023-09-01 PROCEDURE — 87086 URINE CULTURE/COLONY COUNT: CPT

## 2023-09-08 ENCOUNTER — HOSPITAL ENCOUNTER (OUTPATIENT)
Age: 33
End: 2023-09-08
Payer: COMMERCIAL

## 2023-09-08 DIAGNOSIS — Z34.83: Primary | ICD-10-CM

## 2023-09-08 DIAGNOSIS — Z3A.37: ICD-10-CM

## 2023-09-08 PROCEDURE — 87653 STREP B DNA AMP PROBE: CPT

## 2023-09-09 LAB — GP B STREP DNA SPEC QL NAA+PROBE: (no result)

## 2023-09-29 ENCOUNTER — HOSPITAL ENCOUNTER (OUTPATIENT)
Age: 33
Setting detail: OBSERVATION
Discharge: HOME | End: 2023-09-29
Payer: COMMERCIAL

## 2023-09-29 DIAGNOSIS — Z3A.40: ICD-10-CM

## 2023-09-29 DIAGNOSIS — O48.0: Primary | ICD-10-CM

## 2023-09-29 PROCEDURE — G0378 HOSPITAL OBSERVATION PER HR: HCPCS

## 2023-09-29 PROCEDURE — G0379 DIRECT REFER HOSPITAL OBSERV: HCPCS

## 2023-09-29 PROCEDURE — 59025 FETAL NON-STRESS TEST: CPT

## 2023-09-30 ENCOUNTER — HOSPITAL ENCOUNTER (INPATIENT)
Age: 33
LOS: 3 days | Discharge: HOME | End: 2023-10-03
Payer: COMMERCIAL

## 2023-09-30 VITALS — HEART RATE: 97 BPM | SYSTOLIC BLOOD PRESSURE: 119 MMHG | TEMPERATURE: 96.8 F | DIASTOLIC BLOOD PRESSURE: 73 MMHG

## 2023-09-30 DIAGNOSIS — D62: ICD-10-CM

## 2023-09-30 DIAGNOSIS — O48.0: ICD-10-CM

## 2023-09-30 DIAGNOSIS — Z3A.40: ICD-10-CM

## 2023-09-30 DIAGNOSIS — O36.63X0: ICD-10-CM

## 2023-09-30 DIAGNOSIS — Z30.2: ICD-10-CM

## 2023-09-30 LAB
ADD MANUAL DIFF / SLIDE REVIEW: NO
HEMATOCRIT: 34.6 % (ref 36–46)
HEMOGLOBIN: 11.8 G/DL (ref 12–16)
LYMPHOCYTES # SPEC AUTO: 2600 /UL (ref 1100–4500)
MCV RBC: 83.8 FL (ref 80–100)
MEAN CORPUSCULAR HEMOGLOBIN: 28.5 PG (ref 26–34)
MEAN CORPUSCULAR HGB CONC: 34 % (ref 30–36)
PLATELET COUNT: 253 X10^3/UL (ref 150–400)

## 2023-09-30 PROCEDURE — 86900 BLOOD TYPING SEROLOGIC ABO: CPT

## 2023-09-30 PROCEDURE — G0379 DIRECT REFER HOSPITAL OBSERV: HCPCS

## 2023-09-30 PROCEDURE — 82962 GLUCOSE BLOOD TEST: CPT

## 2023-09-30 PROCEDURE — 85018 HEMOGLOBIN: CPT

## 2023-09-30 PROCEDURE — 85025 COMPLETE CBC W/AUTO DIFF WBC: CPT

## 2023-09-30 PROCEDURE — 86850 RBC ANTIBODY SCREEN: CPT

## 2023-09-30 PROCEDURE — 59025 FETAL NON-STRESS TEST: CPT

## 2023-09-30 PROCEDURE — G0378 HOSPITAL OBSERVATION PER HR: HCPCS

## 2023-09-30 PROCEDURE — 86901 BLOOD TYPING SEROLOGIC RH(D): CPT

## 2023-09-30 PROCEDURE — 59510 CESAREAN DELIVERY: CPT

## 2023-09-30 PROCEDURE — 76815 OB US LIMITED FETUS(S): CPT

## 2023-09-30 PROCEDURE — 85014 HEMATOCRIT: CPT

## 2023-09-30 PROCEDURE — 36415 COLL VENOUS BLD VENIPUNCTURE: CPT

## 2023-09-30 PROCEDURE — 59050 FETAL MONITOR W/REPORT: CPT

## 2023-09-30 PROCEDURE — 59514 CESAREAN DELIVERY ONLY: CPT

## 2023-10-01 VITALS
RESPIRATION RATE: 21 BRPM | TEMPERATURE: 98.1 F | DIASTOLIC BLOOD PRESSURE: 65 MMHG | HEART RATE: 91 BPM | OXYGEN SATURATION: 100 % | SYSTOLIC BLOOD PRESSURE: 124 MMHG

## 2023-10-01 VITALS
SYSTOLIC BLOOD PRESSURE: 121 MMHG | HEART RATE: 115 BPM | TEMPERATURE: 97.1 F | DIASTOLIC BLOOD PRESSURE: 99 MMHG | OXYGEN SATURATION: 100 % | RESPIRATION RATE: 17 BRPM

## 2023-10-01 VITALS
OXYGEN SATURATION: 100 % | RESPIRATION RATE: 15 BRPM | HEART RATE: 80 BPM | SYSTOLIC BLOOD PRESSURE: 110 MMHG | DIASTOLIC BLOOD PRESSURE: 74 MMHG

## 2023-10-01 VITALS
SYSTOLIC BLOOD PRESSURE: 114 MMHG | RESPIRATION RATE: 20 BRPM | HEART RATE: 81 BPM | DIASTOLIC BLOOD PRESSURE: 81 MMHG | OXYGEN SATURATION: 100 %

## 2023-10-01 VITALS
SYSTOLIC BLOOD PRESSURE: 137 MMHG | OXYGEN SATURATION: 99 % | HEART RATE: 105 BPM | RESPIRATION RATE: 26 BRPM | DIASTOLIC BLOOD PRESSURE: 82 MMHG

## 2023-10-02 LAB
ADD MANUAL DIFF / SLIDE REVIEW: NO
HEMATOCRIT: 23.8 % (ref 36–46)
HEMOGLOBIN: 8.1 G/DL (ref 12–16)
LYMPHOCYTES # SPEC AUTO: 2700 /UL (ref 1100–4500)
MCV RBC: 83.9 FL (ref 80–100)
MEAN CORPUSCULAR HEMOGLOBIN: 28.7 PG (ref 26–34)
MEAN CORPUSCULAR HGB CONC: 34.2 % (ref 30–36)
PLATELET COUNT: 190 X10^3/UL (ref 150–400)

## 2023-10-03 LAB
HEMATOCRIT: 24.8 % (ref 36–46)
HEMOGLOBIN: 8.5 G/DL (ref 12–16)

## 2024-04-04 ENCOUNTER — HOSPITAL ENCOUNTER (EMERGENCY)
Age: 34
Discharge: HOME | End: 2024-04-04
Payer: COMMERCIAL

## 2024-04-04 VITALS — OXYGEN SATURATION: 99 % | SYSTOLIC BLOOD PRESSURE: 100 MMHG | HEART RATE: 91 BPM | DIASTOLIC BLOOD PRESSURE: 68 MMHG

## 2024-04-04 VITALS
OXYGEN SATURATION: 99 % | SYSTOLIC BLOOD PRESSURE: 106 MMHG | RESPIRATION RATE: 18 BRPM | DIASTOLIC BLOOD PRESSURE: 66 MMHG | BODY MASS INDEX: 28 KG/M2 | TEMPERATURE: 98.24 F | HEART RATE: 125 BPM

## 2024-04-04 VITALS — OXYGEN SATURATION: 100 % | HEART RATE: 92 BPM

## 2024-04-04 VITALS
DIASTOLIC BLOOD PRESSURE: 68 MMHG | HEART RATE: 100 BPM | RESPIRATION RATE: 20 BRPM | SYSTOLIC BLOOD PRESSURE: 100 MMHG | OXYGEN SATURATION: 99 %

## 2024-04-04 VITALS — OXYGEN SATURATION: 100 % | HEART RATE: 108 BPM

## 2024-04-04 VITALS — HEART RATE: 89 BPM | OXYGEN SATURATION: 99 %

## 2024-04-04 VITALS
DIASTOLIC BLOOD PRESSURE: 67 MMHG | RESPIRATION RATE: 20 BRPM | TEMPERATURE: 98 F | OXYGEN SATURATION: 100 % | SYSTOLIC BLOOD PRESSURE: 109 MMHG | HEART RATE: 90 BPM

## 2024-04-04 VITALS — HEART RATE: 110 BPM | SYSTOLIC BLOOD PRESSURE: 109 MMHG | OXYGEN SATURATION: 100 % | DIASTOLIC BLOOD PRESSURE: 67 MMHG

## 2024-04-04 VITALS — HEART RATE: 94 BPM | OXYGEN SATURATION: 100 %

## 2024-04-04 DIAGNOSIS — K61.1: Primary | ICD-10-CM

## 2024-04-04 LAB
ADD MANUAL DIFF / SLIDE REVIEW: NO
ALBUMIN SERPL-MCNC: 4.3 G/DL (ref 3.5–5)
ALBUMIN/GLOB SERPL: 1.1 {RATIO} (ref 1–2.8)
ALP SERPL-CCNC: 112 U/L (ref 38–126)
ALT SERPL-CCNC: 112 IU/L (ref ?–35)
APPEARANCE UR: CLEAR
BILIRUBIN URINE UA: NEGATIVE
BUN SERPL-MCNC: 11 MG/DL (ref 7–17)
C TRACH DNA UR QL NAA+PROBE: NOT DETECTED
CALCIUM SERPL-MCNC: 8.9 MG/DL (ref 8.4–10.2)
CHLORIDE SERPL-SCNC: 106 MMOL/L (ref 98–107)
CO2 SERPL-SCNC: 27 MMOL/L (ref 22–32)
COLOR UR: YELLOW
CULTURE INDICATED URINE: (no result)
ESTIMATED GLOMERULAR FILT RATE: > 60 ML/MIN (ref 60–?)
GLOBULIN SER CALC-MCNC: 3.9 G/DL (ref 1.7–4.1)
GLUCOSE SERPL-MCNC: 97 MG/DL (ref 70–100)
GLUCOSE URINE UA: NEGATIVE G/DL
HEMATOCRIT: 38 % (ref 36–46)
HEMOGLOBIN: 12.7 G/DL (ref 12–16)
HEMOLYSIS: < 15 (ref 0–50)
HGB UR QL: NEGATIVE
KETONES URINE UA: NEGATIVE
LEUKOCYTE ESTERASE URINE UA: (no result)
LYMPHOCYTES # SPEC AUTO: 2300 /UL (ref 1100–4500)
MCV RBC: 84.7 FL (ref 80–100)
MEAN CORPUSCULAR HEMOGLOBIN: 28.3 PG (ref 26–34)
MEAN CORPUSCULAR HGB CONC: 33.4 % (ref 30–36)
N GONORRHOEA RRNA UR QL NAA+PROBE: NOT DETECTED
NITRITE URINE UA: NEGATIVE
PH UR: 6 [PH] (ref 4.5–8)
PLATELET COUNT: 325 X10^3/UL (ref 150–400)
POTASSIUM SERPL-SCNC: 4.1 MMOL/L (ref 3.4–5.1)
PROT SERPL-MCNC: 8.2 G/DL (ref 6.3–8.2)
PROTEIN URINE UA: (no result)
SODIUM SERPL-SCNC: 138 MMOL/L (ref 137–145)
SP GR UR: 1.02 (ref 1–1.03)
UROBILINOGEN UR QL: 0.2 E.U./DL

## 2024-04-04 PROCEDURE — 80053 COMPREHEN METABOLIC PANEL: CPT

## 2024-04-04 PROCEDURE — 96375 TX/PRO/DX INJ NEW DRUG ADDON: CPT

## 2024-04-04 PROCEDURE — 87210 SMEAR WET MOUNT SALINE/INK: CPT

## 2024-04-04 PROCEDURE — 87491 CHLMYD TRACH DNA AMP PROBE: CPT

## 2024-04-04 PROCEDURE — 81001 URINALYSIS AUTO W/SCOPE: CPT

## 2024-04-04 PROCEDURE — 87591 N.GONORRHOEAE DNA AMP PROB: CPT

## 2024-04-04 PROCEDURE — 85025 COMPLETE CBC W/AUTO DIFF WBC: CPT

## 2024-04-04 PROCEDURE — 74177 CT ABD & PELVIS W/CONTRAST: CPT

## 2024-04-04 PROCEDURE — 99284 EMERGENCY DEPT VISIT MOD MDM: CPT

## 2024-04-04 PROCEDURE — 76830 TRANSVAGINAL US NON-OB: CPT

## 2024-04-04 PROCEDURE — 87070 CULTURE OTHR SPECIMN AEROBIC: CPT

## 2024-04-04 PROCEDURE — 87077 CULTURE AEROBIC IDENTIFY: CPT

## 2024-04-04 PROCEDURE — 96374 THER/PROPH/DIAG INJ IV PUSH: CPT

## 2024-04-04 PROCEDURE — 87205 SMEAR GRAM STAIN: CPT

## 2024-04-04 PROCEDURE — 76856 US EXAM PELVIC COMPLETE: CPT

## 2024-04-04 PROCEDURE — 87086 URINE CULTURE/COLONY COUNT: CPT

## 2024-04-06 ENCOUNTER — HOSPITAL ENCOUNTER (INPATIENT)
Dept: HOSPITAL 73 - ED | Age: 34
LOS: 2 days | Discharge: HOME | DRG: 395 | End: 2024-04-08
Payer: COMMERCIAL

## 2024-04-06 VITALS
HEART RATE: 122 BPM | DIASTOLIC BLOOD PRESSURE: 57 MMHG | OXYGEN SATURATION: 99 % | RESPIRATION RATE: 14 BRPM | SYSTOLIC BLOOD PRESSURE: 102 MMHG

## 2024-04-06 VITALS
TEMPERATURE: 98.96 F | DIASTOLIC BLOOD PRESSURE: 60 MMHG | SYSTOLIC BLOOD PRESSURE: 103 MMHG | OXYGEN SATURATION: 100 % | RESPIRATION RATE: 22 BRPM | HEART RATE: 148 BPM

## 2024-04-06 VITALS — HEART RATE: 155 BPM | OXYGEN SATURATION: 99 % | SYSTOLIC BLOOD PRESSURE: 103 MMHG | DIASTOLIC BLOOD PRESSURE: 60 MMHG

## 2024-04-06 VITALS — OXYGEN SATURATION: 100 % | HEART RATE: 126 BPM

## 2024-04-06 VITALS
OXYGEN SATURATION: 99 % | RESPIRATION RATE: 16 BRPM | DIASTOLIC BLOOD PRESSURE: 63 MMHG | SYSTOLIC BLOOD PRESSURE: 96 MMHG | HEART RATE: 123 BPM

## 2024-04-06 VITALS — BODY MASS INDEX: 27.5 KG/M2

## 2024-04-06 DIAGNOSIS — R50.9: ICD-10-CM

## 2024-04-06 DIAGNOSIS — R05.9: ICD-10-CM

## 2024-04-06 DIAGNOSIS — K61.0: Primary | ICD-10-CM

## 2024-04-06 LAB
ADD MANUAL DIFF / SLIDE REVIEW: NO
HEMATOCRIT: 36.7 % (ref 36–46)
HEMOGLOBIN: 12.2 G/DL (ref 12–16)
LYMPHOCYTES # SPEC AUTO: 1700 /UL (ref 1100–4500)
MCV RBC: 85.3 FL (ref 80–100)
MEAN CORPUSCULAR HEMOGLOBIN: 28.3 PG (ref 26–34)
MEAN CORPUSCULAR HGB CONC: 33.2 % (ref 30–36)
PLATELET COUNT: 359 X10^3/UL (ref 150–400)

## 2024-04-06 PROCEDURE — 87210 SMEAR WET MOUNT SALINE/INK: CPT

## 2024-04-06 PROCEDURE — 36415 COLL VENOUS BLD VENIPUNCTURE: CPT

## 2024-04-06 PROCEDURE — 96368 THER/DIAG CONCURRENT INF: CPT

## 2024-04-06 PROCEDURE — 96374 THER/PROPH/DIAG INJ IV PUSH: CPT

## 2024-04-06 PROCEDURE — 72193 CT PELVIS W/DYE: CPT

## 2024-04-06 PROCEDURE — 87075 CULTR BACTERIA EXCEPT BLOOD: CPT

## 2024-04-06 PROCEDURE — 76830 TRANSVAGINAL US NON-OB: CPT

## 2024-04-06 PROCEDURE — 85025 COMPLETE CBC W/AUTO DIFF WBC: CPT

## 2024-04-06 PROCEDURE — 76856 US EXAM PELVIC COMPLETE: CPT

## 2024-04-06 PROCEDURE — 87491 CHLMYD TRACH DNA AMP PROBE: CPT

## 2024-04-06 PROCEDURE — 46050 I&D PERIANAL ABSCESS SUPFC: CPT

## 2024-04-06 PROCEDURE — 99284 EMERGENCY DEPT VISIT MOD MDM: CPT

## 2024-04-06 PROCEDURE — 96375 TX/PRO/DX INJ NEW DRUG ADDON: CPT

## 2024-04-06 PROCEDURE — 87086 URINE CULTURE/COLONY COUNT: CPT

## 2024-04-06 PROCEDURE — 99232 SBSQ HOSP IP/OBS MODERATE 35: CPT

## 2024-04-06 PROCEDURE — 87040 BLOOD CULTURE FOR BACTERIA: CPT

## 2024-04-06 PROCEDURE — 74177 CT ABD & PELVIS W/CONTRAST: CPT

## 2024-04-06 PROCEDURE — 80053 COMPREHEN METABOLIC PANEL: CPT

## 2024-04-06 PROCEDURE — 87591 N.GONORRHOEAE DNA AMP PROB: CPT

## 2024-04-06 PROCEDURE — 96365 THER/PROPH/DIAG IV INF INIT: CPT

## 2024-04-06 PROCEDURE — 87205 SMEAR GRAM STAIN: CPT

## 2024-04-06 PROCEDURE — 80048 BASIC METABOLIC PNL TOTAL CA: CPT

## 2024-04-06 PROCEDURE — 83605 ASSAY OF LACTIC ACID: CPT

## 2024-04-06 PROCEDURE — 87077 CULTURE AEROBIC IDENTIFY: CPT

## 2024-04-06 PROCEDURE — 87070 CULTURE OTHR SPECIMN AEROBIC: CPT

## 2024-04-06 PROCEDURE — 81001 URINALYSIS AUTO W/SCOPE: CPT

## 2024-04-06 RX ADMIN — MORPHINE SULFATE 4 MG: 4 INJECTION, SOLUTION INTRAMUSCULAR; INTRAVENOUS at 23:43

## 2024-04-06 RX ADMIN — SODIUM CHLORIDE 1000 ML: 0.9 INJECTION, SOLUTION INTRAVENOUS at 23:30

## 2024-04-07 VITALS
RESPIRATION RATE: 19 BRPM | HEART RATE: 103 BPM | DIASTOLIC BLOOD PRESSURE: 74 MMHG | SYSTOLIC BLOOD PRESSURE: 111 MMHG | OXYGEN SATURATION: 98 %

## 2024-04-07 VITALS
OXYGEN SATURATION: 95 % | DIASTOLIC BLOOD PRESSURE: 74 MMHG | RESPIRATION RATE: 22 BRPM | HEART RATE: 83 BPM | SYSTOLIC BLOOD PRESSURE: 118 MMHG

## 2024-04-07 VITALS
SYSTOLIC BLOOD PRESSURE: 101 MMHG | DIASTOLIC BLOOD PRESSURE: 60 MMHG | OXYGEN SATURATION: 99 % | RESPIRATION RATE: 13 BRPM | HEART RATE: 102 BPM

## 2024-04-07 VITALS — DIASTOLIC BLOOD PRESSURE: 53 MMHG | HEART RATE: 110 BPM | OXYGEN SATURATION: 100 % | SYSTOLIC BLOOD PRESSURE: 104 MMHG

## 2024-04-07 VITALS
TEMPERATURE: 97.88 F | DIASTOLIC BLOOD PRESSURE: 57 MMHG | RESPIRATION RATE: 20 BRPM | SYSTOLIC BLOOD PRESSURE: 96 MMHG | HEART RATE: 92 BPM | OXYGEN SATURATION: 100 %

## 2024-04-07 VITALS
RESPIRATION RATE: 16 BRPM | SYSTOLIC BLOOD PRESSURE: 107 MMHG | DIASTOLIC BLOOD PRESSURE: 68 MMHG | OXYGEN SATURATION: 96 % | HEART RATE: 76 BPM | TEMPERATURE: 96.62 F

## 2024-04-07 VITALS — HEART RATE: 98 BPM | SYSTOLIC BLOOD PRESSURE: 96 MMHG | OXYGEN SATURATION: 100 % | DIASTOLIC BLOOD PRESSURE: 57 MMHG

## 2024-04-07 VITALS
OXYGEN SATURATION: 97 % | RESPIRATION RATE: 20 BRPM | DIASTOLIC BLOOD PRESSURE: 74 MMHG | HEART RATE: 83 BPM | SYSTOLIC BLOOD PRESSURE: 115 MMHG

## 2024-04-07 VITALS — SYSTOLIC BLOOD PRESSURE: 92 MMHG | OXYGEN SATURATION: 99 % | HEART RATE: 97 BPM | DIASTOLIC BLOOD PRESSURE: 55 MMHG

## 2024-04-07 VITALS
DIASTOLIC BLOOD PRESSURE: 60 MMHG | RESPIRATION RATE: 16 BRPM | HEART RATE: 81 BPM | TEMPERATURE: 97.3 F | OXYGEN SATURATION: 100 % | SYSTOLIC BLOOD PRESSURE: 98 MMHG

## 2024-04-07 VITALS
OXYGEN SATURATION: 100 % | RESPIRATION RATE: 19 BRPM | SYSTOLIC BLOOD PRESSURE: 103 MMHG | DIASTOLIC BLOOD PRESSURE: 71 MMHG | HEART RATE: 88 BPM

## 2024-04-07 VITALS — OXYGEN SATURATION: 100 % | SYSTOLIC BLOOD PRESSURE: 96 MMHG | DIASTOLIC BLOOD PRESSURE: 54 MMHG | HEART RATE: 99 BPM

## 2024-04-07 VITALS
HEART RATE: 83 BPM | DIASTOLIC BLOOD PRESSURE: 62 MMHG | RESPIRATION RATE: 16 BRPM | OXYGEN SATURATION: 98 % | TEMPERATURE: 97.52 F | SYSTOLIC BLOOD PRESSURE: 104 MMHG

## 2024-04-07 VITALS
OXYGEN SATURATION: 95 % | RESPIRATION RATE: 16 BRPM | SYSTOLIC BLOOD PRESSURE: 98 MMHG | HEART RATE: 92 BPM | TEMPERATURE: 97.34 F | DIASTOLIC BLOOD PRESSURE: 64 MMHG

## 2024-04-07 VITALS
HEART RATE: 95 BPM | RESPIRATION RATE: 17 BRPM | TEMPERATURE: 98.1 F | OXYGEN SATURATION: 99 % | SYSTOLIC BLOOD PRESSURE: 92 MMHG | DIASTOLIC BLOOD PRESSURE: 56 MMHG

## 2024-04-07 VITALS — HEART RATE: 98 BPM | OXYGEN SATURATION: 98 % | SYSTOLIC BLOOD PRESSURE: 95 MMHG | DIASTOLIC BLOOD PRESSURE: 52 MMHG

## 2024-04-07 VITALS — HEART RATE: 105 BPM | SYSTOLIC BLOOD PRESSURE: 95 MMHG | OXYGEN SATURATION: 100 % | DIASTOLIC BLOOD PRESSURE: 57 MMHG

## 2024-04-07 VITALS
TEMPERATURE: 97.7 F | DIASTOLIC BLOOD PRESSURE: 66 MMHG | RESPIRATION RATE: 18 BRPM | OXYGEN SATURATION: 98 % | SYSTOLIC BLOOD PRESSURE: 96 MMHG | HEART RATE: 83 BPM

## 2024-04-07 VITALS
HEART RATE: 87 BPM | OXYGEN SATURATION: 99 % | TEMPERATURE: 96.62 F | SYSTOLIC BLOOD PRESSURE: 109 MMHG | RESPIRATION RATE: 16 BRPM | DIASTOLIC BLOOD PRESSURE: 64 MMHG

## 2024-04-07 VITALS
OXYGEN SATURATION: 97 % | TEMPERATURE: 97.3 F | HEART RATE: 81 BPM | DIASTOLIC BLOOD PRESSURE: 59 MMHG | RESPIRATION RATE: 16 BRPM | SYSTOLIC BLOOD PRESSURE: 106 MMHG

## 2024-04-07 VITALS — HEART RATE: 106 BPM | OXYGEN SATURATION: 100 %

## 2024-04-07 VITALS — OXYGEN SATURATION: 100 % | HEART RATE: 103 BPM

## 2024-04-07 VITALS
HEART RATE: 104 BPM | OXYGEN SATURATION: 99 % | SYSTOLIC BLOOD PRESSURE: 101 MMHG | RESPIRATION RATE: 23 BRPM | DIASTOLIC BLOOD PRESSURE: 55 MMHG

## 2024-04-07 LAB
ADD MANUAL DIFF / SLIDE REVIEW: NO
ALBUMIN SERPL-MCNC: 4 G/DL (ref 3.5–5)
ALBUMIN/GLOB SERPL: 1 {RATIO} (ref 1–2.8)
ALP SERPL-CCNC: 130 U/L (ref 38–126)
ALT SERPL-CCNC: 70 IU/L (ref ?–35)
BUN SERPL-MCNC: 14 MG/DL (ref 7–17)
BUN SERPL-MCNC: 9 MG/DL (ref 7–17)
CALCIUM SERPL-MCNC: 7.3 MG/DL (ref 8.4–10.2)
CALCIUM SERPL-MCNC: 9.1 MG/DL (ref 8.4–10.2)
CHLORIDE SERPL-SCNC: 106 MMOL/L (ref 98–107)
CHLORIDE SERPL-SCNC: 111 MMOL/L (ref 98–107)
CO2 SERPL-SCNC: 22 MMOL/L (ref 22–32)
CO2 SERPL-SCNC: 25 MMOL/L (ref 22–32)
ESTIMATED GLOMERULAR FILT RATE: > 60 ML/MIN (ref 60–?)
ESTIMATED GLOMERULAR FILT RATE: > 60 ML/MIN (ref 60–?)
GLOBULIN SER CALC-MCNC: 3.9 G/DL (ref 1.7–4.1)
GLUCOSE SERPL-MCNC: 91 MG/DL (ref 70–100)
GLUCOSE SERPL-MCNC: 98 MG/DL (ref 70–100)
HEMATOCRIT: 30.2 % (ref 36–46)
HEMOGLOBIN: 10.1 G/DL (ref 12–16)
HEMOLYSIS: < 15 (ref 0–50)
HEMOLYSIS: < 15 (ref 0–50)
LACTATE SERPL-MCNC: 1 MMOL/L (ref 0.7–2.1)
LYMPHOCYTES # SPEC AUTO: 2400 /UL (ref 1100–4500)
MCV RBC: 85.6 FL (ref 80–100)
MEAN CORPUSCULAR HEMOGLOBIN: 28.6 PG (ref 26–34)
MEAN CORPUSCULAR HGB CONC: 33.4 % (ref 30–36)
PLATELET COUNT: 294 X10^3/UL (ref 150–400)
POTASSIUM SERPL-SCNC: 3.2 MMOL/L (ref 3.4–5.1)
POTASSIUM SERPL-SCNC: 3.3 MMOL/L (ref 3.4–5.1)
PROT SERPL-MCNC: 7.9 G/DL (ref 6.3–8.2)
SODIUM SERPL-SCNC: 139 MMOL/L (ref 137–145)
SODIUM SERPL-SCNC: 139 MMOL/L (ref 137–145)

## 2024-04-07 PROCEDURE — 0D9Q0ZX DRAINAGE OF ANUS, OPEN APPROACH, DIAGNOSTIC: ICD-10-PCS | Performed by: SURGERY

## 2024-04-07 RX ADMIN — ONDANSETRON 4 MG: 2 INJECTION INTRAMUSCULAR; INTRAVENOUS at 08:23

## 2024-04-07 RX ADMIN — BUPIVACAINE HYDROCHLORIDE 30 ML: 2.5 INJECTION, SOLUTION EPIDURAL; INFILTRATION; INTRACAUDAL; PERINEURAL at 14:34

## 2024-04-07 RX ADMIN — PIPERACILLIN SODIUM AND TAZOBACTAM SODIUM 200 GM: 4; .5 INJECTION, POWDER, LYOPHILIZED, FOR SOLUTION INTRAVENOUS at 04:32

## 2024-04-07 RX ADMIN — DEXTROSE AND SODIUM CHLORIDE 84 ML: 5; .9 INJECTION, SOLUTION INTRAVENOUS at 17:08

## 2024-04-07 RX ADMIN — SODIUM CHLORIDE 1000 ML: 0.9 INJECTION, SOLUTION INTRAVENOUS at 01:52

## 2024-04-07 RX ADMIN — POTASSIUM CHLORIDE 80 MEQ: 10 INJECTION, SOLUTION INTRAVENOUS at 09:25

## 2024-04-07 RX ADMIN — POTASSIUM CHLORIDE AND SODIUM CHLORIDE 100 MEQ: 900; 150 INJECTION, SOLUTION INTRAVENOUS at 04:52

## 2024-04-07 RX ADMIN — POTASSIUM CHLORIDE 80 MEQ: 10 INJECTION, SOLUTION INTRAVENOUS at 11:15

## 2024-04-07 RX ADMIN — HYDROMORPHONE HYDROCHLORIDE 0.5 MG: 1 INJECTION, SOLUTION INTRAMUSCULAR; INTRAVENOUS; SUBCUTANEOUS at 04:47

## 2024-04-07 RX ADMIN — HYDROMORPHONE HYDROCHLORIDE 0.5 MG: 1 INJECTION, SOLUTION INTRAMUSCULAR; INTRAVENOUS; SUBCUTANEOUS at 10:16

## 2024-04-07 RX ADMIN — OXYCODONE HYDROCHLORIDE 5 MG: 5 TABLET ORAL at 08:23

## 2024-04-07 RX ADMIN — POTASSIUM CHLORIDE 80 MEQ: 10 INJECTION, SOLUTION INTRAVENOUS at 12:35

## 2024-04-07 RX ADMIN — Medication 650 MG: at 10:57

## 2024-04-07 RX ADMIN — POTASSIUM CHLORIDE 80 MEQ: 10 INJECTION, SOLUTION INTRAVENOUS at 14:33

## 2024-04-07 RX ADMIN — PIPERACILLIN SODIUM AND TAZOBACTAM SODIUM 25 GM: 3; .375 INJECTION, POWDER, LYOPHILIZED, FOR SOLUTION INTRAVENOUS at 09:23

## 2024-04-07 RX ADMIN — ONDANSETRON 4 MG: 2 INJECTION INTRAMUSCULAR; INTRAVENOUS at 15:31

## 2024-04-07 RX ADMIN — OXYCODONE HYDROCHLORIDE 10 MG: 5 TABLET ORAL at 10:56

## 2024-04-07 RX ADMIN — CEFTRIAXONE SODIUM 200 MG: 2 INJECTION, POWDER, FOR SOLUTION INTRAMUSCULAR; INTRAVENOUS at 01:32

## 2024-04-07 RX ADMIN — METRONIDAZOLE 100 MG: 500 INJECTION, SOLUTION INTRAVENOUS at 02:13

## 2024-04-07 RX ADMIN — ONDANSETRON 4 MG: 2 INJECTION INTRAMUSCULAR; INTRAVENOUS at 01:43

## 2024-04-07 RX ADMIN — HYDROMORPHONE HYDROCHLORIDE 1 MG: 1 INJECTION, SOLUTION INTRAMUSCULAR; INTRAVENOUS; SUBCUTANEOUS at 02:20

## 2024-04-07 RX ADMIN — HYDROMORPHONE HYDROCHLORIDE 1 MG: 1 INJECTION, SOLUTION INTRAMUSCULAR; INTRAVENOUS; SUBCUTANEOUS at 13:07

## 2024-04-07 RX ADMIN — KETOROLAC TROMETHAMINE 15 MG: 30 INJECTION, SOLUTION INTRAMUSCULAR at 00:18

## 2024-04-08 VITALS
TEMPERATURE: 98.42 F | SYSTOLIC BLOOD PRESSURE: 111 MMHG | DIASTOLIC BLOOD PRESSURE: 68 MMHG | HEART RATE: 90 BPM | RESPIRATION RATE: 17 BRPM | OXYGEN SATURATION: 100 %

## 2024-04-08 VITALS
HEART RATE: 88 BPM | DIASTOLIC BLOOD PRESSURE: 70 MMHG | OXYGEN SATURATION: 100 % | SYSTOLIC BLOOD PRESSURE: 112 MMHG | TEMPERATURE: 98.42 F | RESPIRATION RATE: 17 BRPM

## 2024-04-08 VITALS
TEMPERATURE: 97.6 F | SYSTOLIC BLOOD PRESSURE: 106 MMHG | HEART RATE: 100 BPM | OXYGEN SATURATION: 100 % | DIASTOLIC BLOOD PRESSURE: 65 MMHG | RESPIRATION RATE: 18 BRPM

## 2024-04-08 VITALS
DIASTOLIC BLOOD PRESSURE: 70 MMHG | TEMPERATURE: 96.9 F | HEART RATE: 85 BPM | OXYGEN SATURATION: 100 % | SYSTOLIC BLOOD PRESSURE: 113 MMHG | RESPIRATION RATE: 19 BRPM

## 2024-04-08 LAB
BUN SERPL-MCNC: 5 MG/DL (ref 7–17)
CALCIUM SERPL-MCNC: 8.2 MG/DL (ref 8.4–10.2)
CHLORIDE SERPL-SCNC: 109 MMOL/L (ref 98–107)
CO2 SERPL-SCNC: 24 MMOL/L (ref 22–32)
ESTIMATED GLOMERULAR FILT RATE: > 60 ML/MIN (ref 60–?)
GLUCOSE SERPL-MCNC: 239 MG/DL (ref 70–100)
HEMOLYSIS: < 15 (ref 0–50)
POTASSIUM SERPL-SCNC: 4 MMOL/L (ref 3.4–5.1)
SODIUM SERPL-SCNC: 136 MMOL/L (ref 137–145)

## 2024-04-08 RX ADMIN — DEXTROSE AND SODIUM CHLORIDE 84 ML: 5; .9 INJECTION, SOLUTION INTRAVENOUS at 04:53

## 2024-04-08 RX ADMIN — VANCOMYCIN 200 MG: 1.5 INJECTION, SOLUTION INTRAVENOUS at 10:59

## 2024-07-12 ENCOUNTER — HOSPITAL ENCOUNTER (OUTPATIENT)
Age: 34
Discharge: HOME | End: 2024-07-12
Payer: COMMERCIAL

## 2024-07-12 VITALS
OXYGEN SATURATION: 100 % | SYSTOLIC BLOOD PRESSURE: 119 MMHG | RESPIRATION RATE: 16 BRPM | DIASTOLIC BLOOD PRESSURE: 66 MMHG | HEART RATE: 85 BPM

## 2024-07-12 VITALS
OXYGEN SATURATION: 100 % | RESPIRATION RATE: 16 BRPM | DIASTOLIC BLOOD PRESSURE: 77 MMHG | TEMPERATURE: 97.16 F | HEART RATE: 77 BPM | SYSTOLIC BLOOD PRESSURE: 111 MMHG

## 2024-07-12 VITALS
SYSTOLIC BLOOD PRESSURE: 113 MMHG | OXYGEN SATURATION: 100 % | HEART RATE: 95 BPM | DIASTOLIC BLOOD PRESSURE: 62 MMHG | RESPIRATION RATE: 18 BRPM

## 2024-07-12 VITALS
TEMPERATURE: 96.98 F | RESPIRATION RATE: 22 BRPM | DIASTOLIC BLOOD PRESSURE: 71 MMHG | SYSTOLIC BLOOD PRESSURE: 122 MMHG | OXYGEN SATURATION: 100 % | HEART RATE: 94 BPM

## 2024-07-12 VITALS
TEMPERATURE: 98.2 F | OXYGEN SATURATION: 100 % | HEART RATE: 82 BPM | RESPIRATION RATE: 18 BRPM | DIASTOLIC BLOOD PRESSURE: 75 MMHG | SYSTOLIC BLOOD PRESSURE: 113 MMHG

## 2024-07-12 VITALS
SYSTOLIC BLOOD PRESSURE: 114 MMHG | HEART RATE: 74 BPM | TEMPERATURE: 98.24 F | RESPIRATION RATE: 12 BRPM | OXYGEN SATURATION: 100 % | DIASTOLIC BLOOD PRESSURE: 70 MMHG

## 2024-07-12 VITALS — BODY MASS INDEX: 31.2 KG/M2 | BODY MASS INDEX: 27.5 KG/M2

## 2024-07-12 DIAGNOSIS — K60.5: Primary | ICD-10-CM

## 2024-07-12 PROCEDURE — 46270 REMOVE ANAL FIST SUBQ: CPT

## 2024-07-12 PROCEDURE — C9290 INJ, BUPIVACAINE LIPOSOME: HCPCS
